# Patient Record
Sex: FEMALE | Race: WHITE | NOT HISPANIC OR LATINO | Employment: FULL TIME | URBAN - METROPOLITAN AREA
[De-identification: names, ages, dates, MRNs, and addresses within clinical notes are randomized per-mention and may not be internally consistent; named-entity substitution may affect disease eponyms.]

---

## 2024-06-17 ENCOUNTER — OFFICE VISIT (OUTPATIENT)
Dept: OBSTETRICS AND GYNECOLOGY | Facility: CLINIC | Age: 34
End: 2024-06-17
Payer: COMMERCIAL

## 2024-06-17 VITALS
WEIGHT: 178.81 LBS | DIASTOLIC BLOOD PRESSURE: 96 MMHG | SYSTOLIC BLOOD PRESSURE: 148 MMHG | BODY MASS INDEX: 32.91 KG/M2 | HEIGHT: 62 IN | HEART RATE: 76 BPM

## 2024-06-17 DIAGNOSIS — Z00.00 BLOOD TESTS FOR ROUTINE GENERAL PHYSICAL EXAMINATION: ICD-10-CM

## 2024-06-17 DIAGNOSIS — Z12.4 ENCOUNTER FOR PAPANICOLAOU SMEAR FOR CERVICAL CANCER SCREENING: ICD-10-CM

## 2024-06-17 DIAGNOSIS — R21 RASH OF GROIN: ICD-10-CM

## 2024-06-17 DIAGNOSIS — Z01.419 GYNECOLOGIC EXAM NORMAL: Primary | ICD-10-CM

## 2024-06-17 DIAGNOSIS — N92.0 MENORRHAGIA WITH REGULAR CYCLE: ICD-10-CM

## 2024-06-17 PROCEDURE — 3008F BODY MASS INDEX DOCD: CPT | Mod: CPTII,S$GLB,, | Performed by: NURSE PRACTITIONER

## 2024-06-17 PROCEDURE — 99459 PELVIC EXAMINATION: CPT | Mod: S$GLB,,, | Performed by: NURSE PRACTITIONER

## 2024-06-17 PROCEDURE — 1159F MED LIST DOCD IN RCRD: CPT | Mod: CPTII,S$GLB,, | Performed by: NURSE PRACTITIONER

## 2024-06-17 PROCEDURE — 3080F DIAST BP >= 90 MM HG: CPT | Mod: CPTII,S$GLB,, | Performed by: NURSE PRACTITIONER

## 2024-06-17 PROCEDURE — 99385 PREV VISIT NEW AGE 18-39: CPT | Mod: S$GLB,,, | Performed by: NURSE PRACTITIONER

## 2024-06-17 PROCEDURE — 1160F RVW MEDS BY RX/DR IN RCRD: CPT | Mod: CPTII,S$GLB,, | Performed by: NURSE PRACTITIONER

## 2024-06-17 PROCEDURE — 3077F SYST BP >= 140 MM HG: CPT | Mod: CPTII,S$GLB,, | Performed by: NURSE PRACTITIONER

## 2024-06-17 NOTE — PROGRESS NOTES
"Subjective     Patient ID: Martine Nolasco is a 34 y.o. female.    Chief Complaint:  Well Woman      History of Present Illness  HPI  Annual Exam-Premenopausal  Patient presents for annual exam. The patient has no complaints today. The patient is sexually active. GYN screening history: last pap: was normal. The patient wears seatbelts: yes.  Patient reports that she does have cycles that come monthly.  She states that the cycle last for 4 days but then she will bleed for the additional 10 days that is more like spotting.  This has been going on for 1 year.  She denies any pain or cramping associated with the excessive bleeding.  She does report that her cycles can be heavy.  She will bleed through a super tampon and a pad and bleed on to her clothes.  She also reports that she has been treated for the past year for a rash that is in her left groin area with topical steroids but seems to light in color but does not go away.  It can be itchy at times. She does report acne that is worse on her cycle       No outpatient medications have been marked as taking for the 24 encounter (Office Visit) with Mariana Jarvis NP.     Vitals:    24 1410   BP: (!) 148/96   Pulse: 76   Weight: 81.1 kg (178 lb 12.8 oz)   Height: 5' 2" (1.575 m)     History reviewed. No pertinent past medical history.  Past Surgical History:   Procedure Laterality Date    APPENDECTOMY      BILATERAL TUBAL LIGATION Bilateral      SECTION      low transverse x 3    WISDOM TOOTH EXTRACTION           GYN & OB History  Patient's last menstrual period was 2024 (exact date).   Date of Last Pap: No result found    OB History    Para Term  AB Living   3 3 3     3   SAB IAB Ectopic Multiple Live Births           3      # Outcome Date GA Lbr John/2nd Weight Sex Type Anes PTL Lv   3 Term 2018    F CS-LTranv   TIEN   2 Term 2016    M CS-LTranv   TIEN   1 Term 2014    M CS-LTranv   TIEN       Review of Systems  Review of Systems "   Constitutional:  Negative for activity change, appetite change, chills, fatigue and fever.   HENT:  Negative for nasal congestion and tinnitus.    Eyes:  Negative for visual disturbance.   Respiratory:  Negative for cough and shortness of breath.    Cardiovascular:  Negative for chest pain and palpitations.   Gastrointestinal:  Negative for abdominal pain, bloating, blood in stool, constipation, nausea and vomiting.   Endocrine: Negative for diabetes, hair loss and hot flashes.   Genitourinary:  Positive for menorrhagia and menstrual problem. Negative for bladder incontinence, decreased libido, dysmenorrhea, dyspareunia, dysuria, flank pain, frequency, genital sores, hematuria, hot flashes, pelvic pain, urgency, vaginal bleeding, vaginal discharge, vaginal pain, urinary incontinence, postcoital bleeding, postmenopausal bleeding, vaginal dryness and vaginal odor.   Musculoskeletal:  Negative for arthralgias, back pain, leg pain and myalgias.   Integumentary:  Positive for rash and acne. Negative for hair changes, mole/lesion, breast mass, nipple discharge, breast skin changes and breast tenderness.   Neurological:  Negative for vertigo, syncope, numbness and headaches.   Hematological:  Does not bruise/bleed easily.   Psychiatric/Behavioral:  Negative for depression and sleep disturbance. The patient is not nervous/anxious.    Breast: Negative for asymmetry, lump, mass, mastodynia, nipple discharge, skin changes and tenderness         Objective   Physical Exam:   Constitutional: She is oriented to person, place, and time. Vital signs are normal. She appears well-developed and well-nourished.    HENT:   Head: Normocephalic.   Nose: No epistaxis.    Eyes: Lids are normal.    Neck: Trachea normal.    Cardiovascular:  Normal rate, regular rhythm and normal heart sounds.             Pulmonary/Chest: Effort normal and breath sounds normal. Right breast exhibits no mass, no skin change, no tenderness and no swelling. Left  breast exhibits no mass, no skin change, no tenderness and no swelling. Breasts are symmetrical.        Abdominal: Soft.     Genitourinary:    Uterus and rectum normal.            Pelvic exam was performed with patient supine.     Labial bartholins normal.Cervix is normal. Right adnexum displays no mass and no tenderness. Left adnexum displays no mass and no tenderness. There is vaginal discharge (yellow) in the vagina. No erythema in the vagina.    pap smear completed          Musculoskeletal: Normal range of motion and moves all extremeties.      Lymphadenopathy:     She has no cervical adenopathy.    Neurological: She is alert and oriented to person, place, and time.    Skin: Skin is warm and dry.    Psychiatric: She has a normal mood and affect. Her speech is normal and behavior is normal. Judgment and thought content normal.       Female chaperone present for exam       Assessment and Plan     1. Gynecologic exam normal    2. Encounter for Papanicolaou smear for cervical cancer screening    3. Blood tests for routine general physical examination    4. Menorrhagia with regular cycle    5. Rash of groin             Plan:  Gynecologic exam normal  Patient was counseled today on A.C.S. Pap guidelines and recommendations for yearly pelvic exams, mammograms and monthly self breast exams; to see her PCP for other health maintenance.     Encounter for Papanicolaou smear for cervical cancer screening  -     Liquid-based pap smear, screening    Blood tests for routine general physical examination  -     CBC Auto Differential; Future; Expected date: 06/17/2024  -     Comprehensive Metabolic Panel; Future; Expected date: 06/17/2024  -     Hemoglobin A1C; Future; Expected date: 06/17/2024  -     Lipid Panel; Future; Expected date: 06/17/2024  -     TSH; Future; Expected date: 06/17/2024  We will get labs and start spironolactone    Menorrhagia with regular cycle  -     US OB/GYN Procedure ([facility]); Future  We will get  ultrasound and decide if she is a better candidate for an ablation verses a hysterectomy.    Rash of groin  -     Ambulatory referral/consult to Dermatology; Future; Expected date: 06/24/2024    FU with rita toribio    Follow up in about 1 year (around 6/17/2025).

## 2024-06-18 ENCOUNTER — TELEPHONE (OUTPATIENT)
Dept: OBSTETRICS AND GYNECOLOGY | Facility: CLINIC | Age: 34
End: 2024-06-18
Payer: COMMERCIAL

## 2024-06-18 LAB
ABS NRBC COUNT: 0 X 10 3/UL (ref 0–0.01)
ABSOLUTE BASOPHIL: 0.07 X 10 3/UL (ref 0–0.22)
ABSOLUTE EOSINOPHIL: 0.18 X 10 3/UL (ref 0.04–0.54)
ABSOLUTE IMMATURE GRAN: 0.02 X 10 3/UL (ref 0–0.04)
ABSOLUTE LYMPHOCYTE: 1.5 X 10 3/UL (ref 0.86–4.75)
ABSOLUTE MONOCYTE: 0.38 X 10 3/UL (ref 0.22–1.08)
ALBUMIN SERPL-MCNC: 4.5 G/DL (ref 3.5–5.2)
ALBUMIN/GLOB SERPL ELPH: 1.5 {RATIO} (ref 1–2.7)
ALP ISOS SERPL LEV INH-CCNC: 85 U/L (ref 35–105)
ALT (SGPT): 44 U/L (ref 0–33)
ANION GAP SERPL CALC-SCNC: 10 MMOL/L (ref 8–17)
AST SERPL-CCNC: 28 U/L (ref 0–32)
BASOPHILS NFR BLD: 1 % (ref 0.2–1.2)
BILIRUBIN, TOTAL: 0.56 MG/DL (ref 0–1.2)
BUN/CREAT SERPL: 11.9 (ref 6–20)
CALCIUM SERPL-MCNC: 9.5 MG/DL (ref 8.6–10.2)
CARBON DIOXIDE, CO2: 26 MMOL/L (ref 22–29)
CHLORIDE: 104 MMOL/L (ref 98–107)
CHOLEST SERPL-MSCNC: 201 MG/DL (ref 100–200)
CREAT SERPL-MCNC: 0.73 MG/DL (ref 0.5–0.9)
EOSINOPHIL NFR BLD: 2.7 % (ref 0.7–7)
ESTIMATED AVERAGE GLUCOSE: 108 MG/DL
GFR ESTIMATION: 109.92 ML/MIN/1.73M2
GLOBULIN: 3 G/DL (ref 1.5–4.5)
GLUCOSE: 85 MG/DL (ref 74–106)
HBA1C MFR BLD: 5.4 % (ref 4–6)
HCT VFR BLD AUTO: 37.6 % (ref 37–47)
HDLC SERPL-MCNC: 57 MG/DL
HGB BLD-MCNC: 12.3 G/DL (ref 12–16)
IMMATURE GRANULOCYTES: 0.3 % (ref 0–0.5)
LDL/HDL RATIO: 2.1 (ref 1–3)
LDLC SERPL CALC-MCNC: 118.6 MG/DL (ref 0–100)
LYMPHOCYTES NFR BLD: 22.2 % (ref 19.3–53.1)
MCH RBC QN AUTO: 27.6 PG (ref 27–32)
MCHC RBC AUTO-ENTMCNC: 32.7 G/DL (ref 32–36)
MCV RBC AUTO: 84.3 FL (ref 82–100)
MONOCYTES NFR BLD: 5.6 % (ref 4.7–12.5)
NEUTROPHILS # BLD AUTO: 4.6 X 10 3/UL (ref 2.15–7.56)
NEUTROPHILS NFR BLD: 68.2 % (ref 34–71.1)
NUCLEATED RED BLOOD CELLS: 0 /100 WBC (ref 0–0.2)
PLATELET # BLD AUTO: 374 X 10 3/UL (ref 135–400)
POTASSIUM: 4.8 MMOL/L (ref 3.5–5.1)
PROT SNV-MCNC: 7.5 G/DL (ref 6.4–8.3)
RBC # BLD AUTO: 4.46 X 10 6/UL (ref 4.2–5.4)
RDW-SD: 43 FL (ref 37–54)
SODIUM: 140 MMOL/L (ref 136–145)
TRIGL SERPL-MCNC: 127 MG/DL (ref 0–150)
TSH SERPL DL<=0.005 MIU/L-ACNC: 2.07 UIU/ML (ref 0.27–4.2)
UREA NITROGEN (BUN): 8.7 MG/DL (ref 6–20)
WBC # BLD: 6.75 X 10 3/UL (ref 4.3–10.8)

## 2024-06-18 RX ORDER — SPIRONOLACTONE 50 MG/1
50 TABLET, FILM COATED ORAL DAILY
Qty: 30 TABLET | Refills: 11 | Status: SHIPPED | OUTPATIENT
Start: 2024-06-18 | End: 2025-06-18

## 2024-06-18 NOTE — TELEPHONE ENCOUNTER
Discussed labs, low cholesterol diet, increase fruits and vegg and exercise 150 min a week. Sent aldactone

## 2024-06-18 NOTE — TELEPHONE ENCOUNTER
----- Message from Johnna Bonilla MA sent at 6/18/2024  1:52 PM CDT -----  Contact: Patient    ----- Message -----  From: Melani Cai  Sent: 6/18/2024   1:50 PM CDT  To: Matheus Peña Staff    Patient called to consult with nurse or staff regarding a missed call from Mariana. She would like a call back and can be reached at [phone]. Thanks/MR

## 2024-06-20 LAB — Lab: NORMAL

## 2024-06-21 ENCOUNTER — TELEPHONE (OUTPATIENT)
Dept: OBSTETRICS AND GYNECOLOGY | Facility: CLINIC | Age: 34
End: 2024-06-21
Payer: COMMERCIAL

## 2024-06-21 ENCOUNTER — PROCEDURE VISIT (OUTPATIENT)
Dept: OBSTETRICS AND GYNECOLOGY | Facility: CLINIC | Age: 34
End: 2024-06-21
Payer: COMMERCIAL

## 2024-06-21 DIAGNOSIS — N92.0 MENORRHAGIA WITH REGULAR CYCLE: ICD-10-CM

## 2024-06-21 PROCEDURE — 76830 TRANSVAGINAL US NON-OB: CPT | Mod: S$GLB,,, | Performed by: OBSTETRICS & GYNECOLOGY

## 2024-06-21 NOTE — TELEPHONE ENCOUNTER
----- Message from Martine Ward sent at 6/21/2024 10:34 AM CDT -----  Name of Who is Calling:pt           What is the request in detail:returning missed call           Can the clinic reply by [facility]:no           What Number to Call Back if not in [facility]: [phone]

## 2024-06-21 NOTE — TELEPHONE ENCOUNTER
Returned patient's call. She states she did receive my message on her pap results. Pt wanted to know if it showed any infection, yeast or bv, on the pap. I adv her there were no documentation on the pap with any infection and the discharge she is experiencing could be just normal discharge. Pt is aware and verbalized understanding.   Kimberly Flynn

## 2024-06-21 NOTE — TELEPHONE ENCOUNTER
Called to notify patient of her results. No answer. Left message on VM stating her results are considered normal and to call office with any questions. Kimberly Flynn

## 2024-06-24 ENCOUNTER — TELEPHONE (OUTPATIENT)
Dept: OBSTETRICS AND GYNECOLOGY | Facility: CLINIC | Age: 34
End: 2024-06-24
Payer: COMMERCIAL

## 2024-06-24 DIAGNOSIS — N92.0 MENORRHAGIA WITH REGULAR CYCLE: Primary | ICD-10-CM

## 2024-12-17 RX ORDER — SPIRONOLACTONE 100 MG/1
100 TABLET, FILM COATED ORAL DAILY
Qty: 30 TABLET | Refills: 6 | Status: SHIPPED | OUTPATIENT
Start: 2024-12-17 | End: 2025-12-17

## 2024-12-17 NOTE — TELEPHONE ENCOUNTER
Called and spoke with the patient in regards to her message. Pt reports still having hormonal acne with the 50mg of Spironolactone. Requesting an increase in dose.   Kimberly Flynn

## 2025-06-19 ENCOUNTER — OFFICE VISIT (OUTPATIENT)
Dept: OBSTETRICS AND GYNECOLOGY | Facility: CLINIC | Age: 35
End: 2025-06-19
Payer: COMMERCIAL

## 2025-06-19 VITALS
SYSTOLIC BLOOD PRESSURE: 125 MMHG | WEIGHT: 159.38 LBS | HEART RATE: 73 BPM | BODY MASS INDEX: 28.24 KG/M2 | DIASTOLIC BLOOD PRESSURE: 84 MMHG | HEIGHT: 63 IN

## 2025-06-19 DIAGNOSIS — L70.9 ACNE, UNSPECIFIED ACNE TYPE: ICD-10-CM

## 2025-06-19 DIAGNOSIS — Z01.419 GYNECOLOGIC EXAM NORMAL: Primary | ICD-10-CM

## 2025-06-19 DIAGNOSIS — Z00.00 BLOOD TESTS FOR ROUTINE GENERAL PHYSICAL EXAMINATION: ICD-10-CM

## 2025-06-19 RX ORDER — SPIRONOLACTONE 100 MG/1
100 TABLET, FILM COATED ORAL DAILY
Qty: 30 TABLET | Refills: 11 | Status: SHIPPED | OUTPATIENT
Start: 2025-06-19 | End: 2026-06-19

## 2025-06-19 NOTE — PROGRESS NOTES
"Subjective     Patient ID: Martine Nolasco is a 35 y.o. female.    Chief Complaint:  Well Woman      History of Present Illness  HPI  Annual Exam-Premenopausal  Patient presents for annual exam. The patient has no complaints today. The patient is sexually active. GYN screening history: last pap was ascus negative HPV    Outpatient Medications Marked as Taking for the 25 encounter (Office Visit) with Mariana Jarvis NP   Medication Sig Dispense Refill    [DISCONTINUED] spironolactone (ALDACTONE) 100 MG tablet Take 1 tablet (100 mg total) by mouth once daily. 30 tablet 6     Vitals:    25 1334   BP: 125/84   Pulse: 73   Weight: 72.3 kg (159 lb 6 oz)   Height: 5' 3" (1.6 m)     History reviewed. No pertinent past medical history.  Past Surgical History:   Procedure Laterality Date    APPENDECTOMY      BILATERAL TUBAL LIGATION Bilateral      SECTION      low transverse x 3    WISDOM TOOTH EXTRACTION           GYN & OB History  Patient's last menstrual period was 2025 (exact date).   Date of Last Pap: 2024    OB History    Para Term  AB Living   3 3 3   3   SAB IAB Ectopic Multiple Live Births       3      # Outcome Date GA Lbr John/2nd Weight Sex Type Anes PTL Lv   3 Term 2018    F CS-LTranv   TIEN   2 Term 2016    M CS-LTranv   TIEN   1 Term 2014    M CS-LTranv   TIEN       Review of Systems  Review of Systems   Constitutional:  Negative for activity change, appetite change, chills, fatigue and fever.   HENT:  Negative for nasal congestion and tinnitus.    Eyes:  Negative for visual disturbance.   Respiratory:  Negative for cough and shortness of breath.    Cardiovascular:  Negative for chest pain and palpitations.   Gastrointestinal:  Negative for abdominal pain, bloating, blood in stool, constipation, nausea and vomiting.   Endocrine: Negative for diabetes, hair loss and hot flashes.   Genitourinary:  Negative for bladder incontinence, decreased libido, dysmenorrhea, " dyspareunia, dysuria, flank pain, frequency, genital sores, hematuria, hot flashes, menorrhagia, menstrual problem, pelvic pain, urgency, vaginal bleeding, vaginal discharge, vaginal pain, urinary incontinence, postcoital bleeding, postmenopausal bleeding, vaginal dryness and vaginal odor.   Musculoskeletal:  Negative for arthralgias, back pain, leg pain and myalgias.   Integumentary:  Negative for rash, acne, hair changes, mole/lesion, breast mass, nipple discharge, breast skin changes and breast tenderness.   Neurological:  Negative for vertigo, syncope, numbness and headaches.   Hematological:  Does not bruise/bleed easily.   Psychiatric/Behavioral:  Negative for depression and sleep disturbance. The patient is not nervous/anxious.    Breast: Negative for asymmetry, lump, mass, mastodynia, nipple discharge, skin changes and tenderness         Objective   Physical Exam:   Constitutional: She is oriented to person, place, and time. She appears well-developed and well-nourished.    HENT:   Head: Normocephalic.   Nose: No epistaxis.      Cardiovascular:  Normal rate, regular rhythm and normal heart sounds.             Pulmonary/Chest: Effort normal and breath sounds normal. Right breast exhibits no inverted nipple, no mass, no nipple discharge, no skin change, no tenderness, no bleeding and no swelling. Left breast exhibits no inverted nipple, no mass, no nipple discharge, no skin change, no tenderness, no bleeding and no swelling.        Abdominal: Soft.     Genitourinary:    Inguinal canal, urethra, bladder, vagina, uterus, right adnexa, left adnexa and rectum normal.      Pelvic exam was performed with patient supine.   The external female genitalia was normal.     Labial bartholins normal.Cervix is normal.    pap smear completedNormal urethral meatus.   Genitourinary Comments: Female chaperone present for exam               Musculoskeletal: Normal range of motion and moves all extremeties.       Neurological: She  is alert and oriented to person, place, and time.    Skin: Skin is warm and dry.    Psychiatric: She has a normal mood and affect. Her behavior is normal.            Assessment and Plan     1. Gynecologic exam normal  Liquid-based pap smear, screening      2. Acne, unspecified acne type  spironolactone (ALDACTONE) 100 MG tablet    Testosterone, Free    Testosterone, Free      3. Blood tests for routine general physical examination  CBC Auto Differential    Comprehensive Metabolic Panel    Lipid Panel    TSH    CBC Auto Differential    Comprehensive Metabolic Panel    Lipid Panel    TSH                Plan:  Gynecologic exam normal  -     Liquid-based pap smear, screening  Patient was counseled today on A.C.S. Pap guidelines and recommendations for yearly pelvic exams, mammograms and monthly self breast exams; to see her PCP for other health maintenance.     Acne, unspecified acne type  -     spironolactone (ALDACTONE) 100 MG tablet; Take 1 tablet (100 mg total) by mouth once daily.  Dispense: 30 tablet; Refill: 11  -     Testosterone, Free; Future; Expected date: 06/19/2025    Blood tests for routine general physical examination  -     CBC Auto Differential; Future; Expected date: 06/19/2025  -     Comprehensive Metabolic Panel; Future; Expected date: 06/19/2025  -     Lipid Panel; Future; Expected date: 06/19/2025  -     TSH; Future; Expected date: 06/19/2025       Follow up in about 1 year (around 6/19/2026).

## 2025-06-24 ENCOUNTER — RESULTS FOLLOW-UP (OUTPATIENT)
Dept: OBSTETRICS AND GYNECOLOGY | Facility: CLINIC | Age: 35
End: 2025-06-24

## 2025-06-24 ENCOUNTER — TELEPHONE (OUTPATIENT)
Dept: OBSTETRICS AND GYNECOLOGY | Facility: CLINIC | Age: 35
End: 2025-06-24
Payer: COMMERCIAL

## 2025-06-27 ENCOUNTER — TELEPHONE (OUTPATIENT)
Dept: OBSTETRICS AND GYNECOLOGY | Facility: CLINIC | Age: 35
End: 2025-06-27
Payer: COMMERCIAL

## 2025-06-27 DIAGNOSIS — E87.5 SERUM POTASSIUM ELEVATED: Primary | ICD-10-CM

## 2025-06-27 NOTE — TELEPHONE ENCOUNTER
----- Message from Nurse Harris sent at 6/27/2025 11:26 AM CDT -----  Please put order in for Potassium lab  ----- Message -----  From: Mariana Jarvis NP  Sent: 6/24/2025   1:02 PM CDT  To: Kimberly Flynn LPN    Labs look good, repeat potassium in 2 weeks non fasting   ----- Message -----  From: [facility]  Sent: 6/24/2025   9:58 AM CDT  To: Mariana Jarvis NP

## 2025-06-27 NOTE — TELEPHONE ENCOUNTER
Copied from CRM #[id number]. Topic: General Inquiry - Return Call  >> Jun 27, 2025 10:26 AM Marcy wrote:  Type:  Patient Returning Call    Who Called:Martine Nolasco    Who Left Message for Patient:Jul;ie   Does the patient know what this is regarding?:-  Would the patient rather a call back or a response via [facility]?    Best Call Back Number:[phone]    Additional Information: Rtc she missed

## 2025-06-27 NOTE — TELEPHONE ENCOUNTER
Returned call. Pt adv of lab results and recommendation to repeat Potassium in 2 weeks, non fasting. Pt is aware and verbalized understanding.  Kimberly Flynn